# Patient Record
Sex: FEMALE | Race: WHITE | NOT HISPANIC OR LATINO | Employment: UNEMPLOYED | URBAN - METROPOLITAN AREA
[De-identification: names, ages, dates, MRNs, and addresses within clinical notes are randomized per-mention and may not be internally consistent; named-entity substitution may affect disease eponyms.]

---

## 2023-10-27 RX ORDER — SODIUM PICOSULFATE, MAGNESIUM OXIDE, AND ANHYDROUS CITRIC ACID 10; 3.5; 12 MG/160ML; G/160ML; G/160ML
350 LIQUID ORAL TAKE AS DIRECTED
Qty: 350 ML | Refills: 0 | Status: SHIPPED | OUTPATIENT
Start: 2023-10-27

## 2023-10-30 ENCOUNTER — TELEPHONE (OUTPATIENT)
Dept: GASTROENTEROLOGY | Facility: CLINIC | Age: 53
End: 2023-10-30
Payer: MEDICARE

## 2023-10-30 RX ORDER — SODIUM PICOSULFATE, MAGNESIUM OXIDE, AND ANHYDROUS CITRIC ACID 10; 3.5; 12 MG/160ML; G/160ML; G/160ML
350 LIQUID ORAL TAKE AS DIRECTED
Qty: 350 ML | Refills: 0 | Status: SHIPPED | OUTPATIENT
Start: 2023-10-30

## 2023-10-30 NOTE — TELEPHONE ENCOUNTER
Caller: Vashti Ledbetter    Relationship: Self    Best call back number: [phone]     Requested Prescriptions: COLON PREP    Pharmacy where request should be sent: [address]  [phone]    Last office visit with prescribing clinician: Visit date not found   Last telemedicine visit with prescribing clinician: Visit date not found   Next office visit with prescribing clinician: Visit date not found     Additional details provided by patient: PATIENT'S COLON PREP RX WAS SENT TO A MAIL PHARMACY AND THEY HAVE ADVISED HER SHE WILL NOT RECEIVE IT IN TIME. PLEASE RESEND RX TO [address]  [phone]. PATIENT WOULD LIKE A CALL BACK [phone] ONCE SENT.    Does the patient have less than a 3 day supply:  [x] Yes  [] No    Would you like a call back once the refill request has been completed: [x] Yes [] No    If the office needs to give you a call back, can they leave a voicemail: [x] Yes [] No

## 2023-11-01 ENCOUNTER — TELEPHONE (OUTPATIENT)
Dept: GASTROENTEROLOGY | Facility: CLINIC | Age: 53
End: 2023-11-01
Payer: MEDICARE

## 2023-11-01 NOTE — TELEPHONE ENCOUNTER
Hub staff attempted to follow warm transfer process and was unsuccessful     Caller: Vashti Ledbetter    Relationship to patient: Self    Best call back number: [phone]     Patient is needing: PATIENT HAS A COLONOSCOPY SCHEDULED WITH DR FRANCES ON 11/2 AND THEY RECEIVED 2 DIFFERENT PREPS AND HAS QUESTIONS. PLEASE CALL PATIENT.

## 2023-11-02 ENCOUNTER — OUTSIDE FACILITY SERVICE (OUTPATIENT)
Dept: GASTROENTEROLOGY | Facility: CLINIC | Age: 53
End: 2023-11-02
Payer: MEDICARE

## 2023-11-02 PROCEDURE — 45388 COLONOSCOPY W/ABLATION: CPT | Performed by: INTERNAL MEDICINE

## 2023-11-02 PROCEDURE — 45390 COLONOSCOPY W/RESECTION: CPT | Performed by: INTERNAL MEDICINE

## 2023-11-02 PROCEDURE — 45385 COLONOSCOPY W/LESION REMOVAL: CPT | Performed by: INTERNAL MEDICINE

## 2023-11-02 PROCEDURE — 88305 TISSUE EXAM BY PATHOLOGIST: CPT

## 2023-11-03 ENCOUNTER — LAB REQUISITION (OUTPATIENT)
Dept: LAB | Facility: HOSPITAL | Age: 53
End: 2023-11-03
Payer: MEDICARE

## 2023-11-03 DIAGNOSIS — D12.2 BENIGN NEOPLASM OF ASCENDING COLON: ICD-10-CM

## 2023-11-03 DIAGNOSIS — D12.5 BENIGN NEOPLASM OF SIGMOID COLON: ICD-10-CM

## 2023-11-03 DIAGNOSIS — Z12.11 ENCOUNTER FOR SCREENING FOR MALIGNANT NEOPLASM OF COLON: ICD-10-CM

## 2023-11-03 DIAGNOSIS — K64.8 OTHER HEMORRHOIDS: ICD-10-CM

## 2023-11-03 DIAGNOSIS — D12.0 BENIGN NEOPLASM OF CECUM: ICD-10-CM

## 2023-11-03 DIAGNOSIS — D12.7 BENIGN NEOPLASM OF RECTOSIGMOID JUNCTION: ICD-10-CM

## 2023-11-06 LAB — REF LAB TEST METHOD: NORMAL

## 2024-05-01 RX ORDER — SODIUM, POTASSIUM,MAG SULFATES 17.5-3.13G
1 SOLUTION, RECONSTITUTED, ORAL ORAL EVERY 12 HOURS
Qty: 354 ML | Refills: 0 | Status: SHIPPED | OUTPATIENT
Start: 2024-05-01

## 2024-05-08 ENCOUNTER — OUTSIDE FACILITY SERVICE (OUTPATIENT)
Dept: GASTROENTEROLOGY | Facility: CLINIC | Age: 54
End: 2024-05-08
Payer: MEDICARE

## 2024-05-08 PROCEDURE — G0121 COLON CA SCRN NOT HI RSK IND: HCPCS | Performed by: INTERNAL MEDICINE

## 2025-04-17 ENCOUNTER — OFFICE VISIT (OUTPATIENT)
Dept: CARDIOLOGY | Facility: CLINIC | Age: 55
End: 2025-04-17
Payer: MEDICARE

## 2025-04-17 VITALS
WEIGHT: 105 LBS | HEART RATE: 97 BPM | HEIGHT: 55 IN | BODY MASS INDEX: 24.3 KG/M2 | OXYGEN SATURATION: 100 % | SYSTOLIC BLOOD PRESSURE: 110 MMHG | DIASTOLIC BLOOD PRESSURE: 70 MMHG

## 2025-04-17 DIAGNOSIS — I49.1 PREMATURE ATRIAL CONTRACTION: Primary | ICD-10-CM

## 2025-04-17 RX ORDER — METOPROLOL TARTRATE 50 MG
50 TABLET ORAL DAILY
COMMUNITY

## 2025-04-17 RX ORDER — DILTIAZEM HYDROCHLORIDE 180 MG/1
180 CAPSULE, COATED, EXTENDED RELEASE ORAL DAILY
COMMUNITY

## 2025-04-17 RX ORDER — PANTOPRAZOLE SODIUM 40 MG/1
40 TABLET, DELAYED RELEASE ORAL DAILY
COMMUNITY

## 2025-04-17 RX ORDER — LEVOTHYROXINE SODIUM 112 UG/1
112 TABLET ORAL
COMMUNITY

## 2025-04-17 NOTE — PROGRESS NOTES
Electrophysiology Clinic Consult     Vashti Ledbetter  1970  [unfilled]  [unfilled]    04/17/25    DATE OF ADMISSION: (Not on file)  [facility] CARDIOLOGY    Kush Viveros MD  [address] [facility]  / [place] [address]  Referring Provider: Kush Viveros MD     Chief Complaint   Patient presents with    Atrial Fibrillation     Problem List:  Questionable atrial fibrillation/sinus tachycardia with PACs  CHADSvasc = 1  Diagnosed 2/8/2025 when presented to ED with palpitations   Myocardial perfusion study 8/16/2018 resting sinus tachycardia, normal exercise EKG, normal myocardial perfusion, normal LV systolic function  Echocardiogram 5/8/2023: EF 65 to 70% with mild to moderate concentric hypertrophy, no significant valvular abnormalities, trivial pericardial effusion  Echocardiogram 3/6/2025: EF 65 to 70% mild to moderate concentric hypertrophy small circumferential pericardial effusion with no evidence for tamponade, no valvular abnormalities  Coarctation of the aorta   status post repair 1976  Cardiac MRI 6/12/2018: EF 61%, late gadolinium enhancement imaging demonstrates linear mid myocardial fibrosis mainly in the septum and lateral walls of a nonischemic etiology, normal aortic size, no evidence of aneurysm or dissection no significant recoarctation with post coarctation size measuring 2.5 cm repair site measures 1.5 x 1.7 cm  Hypertension  GERD  Wu syndrome  COPD  Ménière's disease  Hypothyroidism  Osteoporosis  Abnormal thyroid studies 2/8/2025:  TSH 0.46, high free T4  1.60  History of malignant neoplasm of the thyroid s/p thyroidectomy         History of Present Illness:   Vashti Ledbetter is a 54 year old female with above history who presents today in consultation, referred by Dr. Hartman for atrial fibrillation.  She was diagnosed with atrial fibrillation when she presented to the ED on 2/8/2025 with rapid palpitations, which she described as moderate in nature.  However, review of her EKGs all show ST with PACs She was placed on Eliquis prior to her discharge. At that time, her labwork revealed elevated free t4 and low tsh. She has had a thyroidectomy due to cancer and follows with endocrinology. Her thyroid medication had been lowered in January just prior to this episode. She has not had further adjustment in her medication and goes back in July. She denies any further episodes of atrial fibrillation. She has been on Eliquis without bleeding issues. No CVA symptoms. She denies CP, SOB, syncope.   Tobacco: none  ETOH: none  Caffeine: she drinks a lot of tea   Sleep apnea: she has not been tested, she thinks she snores.        No Known Allergies     Cannot display prior to admission medications because the patient has not been admitted in this contact.              Current Outpatient Medications:     apixaban (ELIQUIS) 5 MG tablet tablet, Take 1 tablet by mouth 2 (Two) Times a Day., Disp: , Rfl:     dilTIAZem CD (CARDIZEM CD) 180 MG 24 hr capsule, Take 1 capsule by mouth Daily., Disp: , Rfl:     levothyroxine (SYNTHROID, LEVOTHROID) 112 MCG tablet, Take 1 tablet by mouth Every Morning., Disp: , Rfl:     metoprolol tartrate (LOPRESSOR) 50 MG tablet, Take 1 tablet by mouth Daily., Disp: , Rfl:     pantoprazole (PROTONIX) 40 MG EC tablet, Take 1 tablet by mouth Daily., Disp: , Rfl:     Social History     Socioeconomic History    Marital status: Single   Tobacco Use    Smoking status: Never    Smokeless tobacco: Never   Substance and Sexual Activity    Alcohol use: Never    Drug use: Never    Sexual activity: Defer       Family History: mother has AFIB, pacemaker.     REVIEW OF SYSTEMS:   CONST:  No weight loss, fever, chills, weakness + fatigue.   HEENT:  No visual loss, blurred vision, double vision, yellow sclerae.                   No hearing loss, congestion, sore throat.   SKIN:      No rashes, urticaria, ulcers, sores.     RESP:     No shortness of breath, hemoptysis,  "cough, sputum.   GI:           No anorexia, nausea, vomiting, diarrhea. No abdominal pain, melena.   :         No burning on urination, hematuria or increased frequency.  ENDO:    No diaphoresis, cold or heat intolerance. No polyuria or polydipsia.   NEURO:  No headache, dizziness, syncope, paralysis, ataxia, or parasthesias.                  No change in bowel or bladder control. No history of CVA/TIA  MUSC:    No muscle, back pain, joint pain or stiffness.   HEME:    No anemia, bleeding, bruising. No history of DVT/PE.  PSYCH:  No history of depression, anxiety    Vitals:    04/17/25 1218   BP: 110/70   BP Location: Left arm   Patient Position: Sitting   Pulse: 97   SpO2: 100%   Weight: 47.6 kg (105 lb)   Height: 134.6 cm (53\")                 Physical Exam:  GEN: Well nourished, well-developed, no acute distress  HEENT: Normocephalic, atraumatic, PERRLA, moist mucous membranes  NECK: Supple, NO JVD, no thyromegaly, no lymphadenopathy  CARD: S1S2, RRR, no murmur, gallop, rub  LUNGS: Clear to auscultation, normal respiratory effort  ABDOMEN: Soft, nontender, normal bowel sounds  EXTREMITIES: No gross deformities, no clubbing, cyanosis, or edema  SKIN: Warm, dry  NEURO: No focal deficits, alert and oriented x 3  PSYCHIATRIC: Normal affect and mood      I personally viewed and interpreted the patient's EKG/Telemetry/lab data    Lab Results   Component Value Date    CALCIUM 9.6 12/02/2021     12/02/2021    K 4.5 12/02/2021    CO2 24 12/02/2021     12/02/2021    BUN 9 12/02/2021    CREATININE 0.80 12/02/2021    EGFRIFAFRI >60 12/02/2021    EGFRIFNONA >60 12/02/2021    BCR 11 12/02/2021    ANIONGAP 13 12/02/2021     Lab Results   Component Value Date    WBC 8.22 03/17/2023    HGB 16.0 (H) 03/17/2023    HCT 46.7 (H) 03/17/2023    MCV 91 03/17/2023     03/17/2023     Lab Results   Component Value Date    INR 0.9 03/17/2023     No results found for: \"TSH\", \"R3JCKLM\", \"O3OPKBW\", \"THYROIDAB\"    Reviewed " all EKGs from 2/8/2025 ED visit which showed sinus tachycardia and PACs.  No atrial fibrillation         ECG 12 Lead    Date/Time: 4/17/2025 1:06 PM  Performed by: Juancarlos Hill MD    Authorized by: Juancarlos Hill MD  Comparison: compared with previous ECG from 2/8/2025  Comparison to previous ECG: Now in normal sinus rhythm without PACs  Rhythm: sinus rhythm  BPM: 90            ICD-10-CM ICD-9-CM   1. Premature atrial contraction  I49.1 427.61       Assessment and Plan:   Palpitations:  - The patient was apparently diagnosed with atrial fibrillation at the ED at [facility] on 2/8/2025 when she presented with tachypalpitations.  We have reviewed all EKGs from that visit which showed normal sinus rhythm with PACs.  She has been doing better since being placed on diltiazem in addition to her metoprolol.  She was placed on Eliquis.  We will leave her on that for now and do a 2-week Zio patch to rule out any atrial fibrillation that she may be having and not realize.  Once results of the Zio patch are back if she has not had any atrial fibrillation, we will likely discontinue her Eliquis.    Scribed for Juancarlos Hill MD by Rocio Tboias PA-C. 4/17/2025  13:08 EDT     IJuancarlos MD, personally performed the services described in this documentation as scribed by the above named individual in my presence, and it is both accurate and complete.  4/17/2025  13:18 EDT

## 2025-05-19 ENCOUNTER — TELEPHONE (OUTPATIENT)
Dept: CARDIOLOGY | Facility: CLINIC | Age: 55
End: 2025-05-19
Payer: MEDICARE

## 2025-05-20 ENCOUNTER — TELEPHONE (OUTPATIENT)
Dept: CARDIOLOGY | Facility: CLINIC | Age: 55
End: 2025-05-20
Payer: MEDICARE

## 2025-05-22 NOTE — TELEPHONE ENCOUNTER
I called to f/u with the patient. I discussed the results of her monitor with her. She does want to go ahead and stop taking Eliquis now. She states that she cannot feel the PAC's and is currently asymptomatic.